# Patient Record
Sex: FEMALE | URBAN - METROPOLITAN AREA
[De-identification: names, ages, dates, MRNs, and addresses within clinical notes are randomized per-mention and may not be internally consistent; named-entity substitution may affect disease eponyms.]

---

## 2020-09-09 ENCOUNTER — APPOINTMENT (OUTPATIENT)
Dept: URBAN - METROPOLITAN AREA CLINIC 319 | Age: 70
Setting detail: DERMATOLOGY
End: 2020-09-09

## 2020-09-09 DIAGNOSIS — L66.1 LICHEN PLANOPILARIS: ICD-10-CM

## 2020-09-09 PROBLEM — L30.9 DERMATITIS, UNSPECIFIED: Status: ACTIVE | Noted: 2020-09-09

## 2020-09-09 PROCEDURE — 11104 PUNCH BX SKIN SINGLE LESION: CPT

## 2020-09-09 PROCEDURE — OTHER BIOPSY BY PUNCH METHOD: OTHER

## 2020-09-09 PROCEDURE — OTHER PRESCRIPTION: OTHER

## 2020-09-09 PROCEDURE — OTHER COUNSELING: OTHER

## 2020-09-09 PROCEDURE — OTHER MIPS QUALITY: OTHER

## 2020-09-09 RX ORDER — CLOBETASOL PROPIONATE 0.5 MG/ML
SOLUTION TOPICAL QHS
Qty: 1 | Refills: 3 | Status: ERX | COMMUNITY
Start: 2020-09-09

## 2020-09-09 ASSESSMENT — LOCATION DETAILED DESCRIPTION DERM: LOCATION DETAILED: MID-OCCIPITAL SCALP

## 2020-09-09 ASSESSMENT — LOCATION ZONE DERM: LOCATION ZONE: SCALP

## 2020-09-09 ASSESSMENT — LOCATION SIMPLE DESCRIPTION DERM: LOCATION SIMPLE: POSTERIOR SCALP

## 2020-09-09 NOTE — PROCEDURE: MIPS QUALITY
Detail Level: Generalized
Quality 111:Pneumonia Vaccination Status For Older Adults: Pneumococcal Vaccination Previously Received
Quality 402: Tobacco Use And Help With Quitting Among Adolescents: Patient screened for tobacco and never smoked
Quality 110: Preventive Care And Screening: Influenza Immunization: Influenza Immunization Administered during Influenza season

## 2020-09-09 NOTE — PROCEDURE: BIOPSY BY PUNCH METHOD
Hemostasis: None
Render Path Notes In Note?: No
Size Of Lesion In Cm (Optional): 0
Wound Care: Vaseline
Billing Type: Third-Party Bill
Anesthesia Type: 1% lidocaine without epinephrine
Biopsy Type: H and E
Dressing: Band-Aid
Epidermal Sutures: 4-0 Nylon
Detail Level: Detailed
Validate Note Data (See Information Below): Yes
Notification Instructions: Patient will be notified of biopsy results. However, patient instructed to call the office if not contacted within 2 weeks.
Home Suture Removal Text: Patient was provided a home suture removal kit and will remove their sutures at home.  If they have any questions or difficulties they will call the office.
Punch Size In Mm: 3.5
Consent: Written consent was obtained and risks were reviewed including but not limited to scarring, infection, bleeding, scabbing, incomplete removal, nerve damage and allergy to anesthesia.
Anesthesia Volume In Cc (Will Not Render If 0): 1
Information: Selecting Yes will display possible errors in your note based on the variables you have selected. This validation is only offered as a suggestion for you. PLEASE NOTE THAT THE VALIDATION TEXT WILL BE REMOVED WHEN YOU FINALIZE YOUR NOTE. IF YOU WANT TO FAX A PRELIMINARY NOTE YOU WILL NEED TO TOGGLE THIS TO 'NO' IF YOU DO NOT WANT IT IN YOUR FAXED NOTE.
Post-Care Instructions: I reviewed with the patient in detail post-care instructions. Patient is to keep the biopsy site dry overnight, and then apply bacitracin twice daily until healed. Patient may apply hydrogen peroxide soaks to remove any crusting.

## 2020-09-09 NOTE — HPI: HAIR LOSS
Previous Labs: No
How Did The Hair Loss Occur?: sudden in onset
How Severe Is Your Hair Loss?: moderate
Additional History: Patient states she had knee surgery about a year and a half ago and she had another surgery this past June.

## 2020-09-23 ENCOUNTER — RX ONLY (RX ONLY)
Age: 70
End: 2020-09-23

## 2020-09-23 ENCOUNTER — APPOINTMENT (OUTPATIENT)
Dept: URBAN - METROPOLITAN AREA CLINIC 319 | Age: 70
Setting detail: DERMATOLOGY
End: 2020-09-23

## 2020-09-23 DIAGNOSIS — Z48.02 ENCOUNTER FOR REMOVAL OF SUTURES: ICD-10-CM

## 2020-09-23 DIAGNOSIS — L66.1 LICHEN PLANOPILARIS: ICD-10-CM

## 2020-09-23 PROCEDURE — 99024 POSTOP FOLLOW-UP VISIT: CPT

## 2020-09-23 PROCEDURE — 99213 OFFICE O/P EST LOW 20 MIN: CPT

## 2020-09-23 PROCEDURE — OTHER SUTURE REMOVAL (GLOBAL PERIOD): OTHER

## 2020-09-23 PROCEDURE — OTHER TREATMENT REGIMEN: OTHER

## 2020-09-23 PROCEDURE — OTHER PRESCRIPTION: OTHER

## 2020-09-23 PROCEDURE — OTHER COUNSELING: OTHER

## 2020-09-23 PROCEDURE — OTHER PATHOLOGY DISCUSSION: OTHER

## 2020-09-23 RX ORDER — ENOXAPARIN SODIUM 30 MG/.3ML
INJECTION SUBCUTANEOUS
Qty: 1 | Refills: 12 | Status: ERX

## 2020-09-23 RX ORDER — CLOBETASOL PROPIONATE 0.5 MG/G
OINTMENT TOPICAL
Qty: 1 | Refills: 2 | Status: ERX | COMMUNITY
Start: 2020-09-23

## 2020-09-23 RX ORDER — ENOXAPARIN SODIUM 30 MG/.3ML
INJECTION SUBCUTANEOUS
Qty: 1 | Refills: 2 | Status: ERX | COMMUNITY
Start: 2020-09-23

## 2020-09-23 ASSESSMENT — LOCATION SIMPLE DESCRIPTION DERM
LOCATION SIMPLE: SCALP
LOCATION SIMPLE: POSTERIOR SCALP

## 2020-09-23 ASSESSMENT — LOCATION DETAILED DESCRIPTION DERM
LOCATION DETAILED: RIGHT SUPERIOR PARIETAL SCALP
LOCATION DETAILED: MID-OCCIPITAL SCALP

## 2020-09-23 ASSESSMENT — LOCATION ZONE DERM: LOCATION ZONE: SCALP

## 2020-09-23 NOTE — PROCEDURE: TREATMENT REGIMEN
Initiate Treatment: Clobetasol ointment thin layer to affected areas at bedtime cover with a head net.\\nLovenox 30mg inject subcutaneously once a week.

## 2020-09-23 NOTE — PROCEDURE: TREATMENT REGIMEN
Continue Regimen: Clobetasol scalp solution apply thin layer in the morning to affected areas on scalp.

## 2020-09-23 NOTE — PROCEDURE: SUTURE REMOVAL (GLOBAL PERIOD)
Detail Level: Detailed
Add 73226 Cpt? (Important Note: In 2017 The Use Of 39476 Is Being Tracked By Cms To Determine Future Global Period Reimbursement For Global Periods): yes

## 2020-09-23 NOTE — PROCEDURE: TREATMENT REGIMEN
Plan: October 10,2020 she is scheduled with primary care doctor to have blood work and will fax our office a copy of lab results to [phone]. We also recommend making sure hyperthyroid is normal with labs it has been normal with her medication. Plan: October 10,2020 she is scheduled with primary care doctor to have blood work and will fax our office a copy of lab results to [phone]. We also recommend making sure hyperthyroid is normal with labs it has been normal with her medication.

## 2020-12-22 ENCOUNTER — APPOINTMENT (OUTPATIENT)
Dept: URBAN - METROPOLITAN AREA CLINIC 319 | Age: 70
Setting detail: DERMATOLOGY
End: 2020-12-22

## 2020-12-22 DIAGNOSIS — L66.1 LICHEN PLANOPILARIS: ICD-10-CM

## 2020-12-22 PROCEDURE — OTHER COUNSELING: OTHER

## 2020-12-22 PROCEDURE — 99213 OFFICE O/P EST LOW 20 MIN: CPT

## 2020-12-22 PROCEDURE — OTHER TREATMENT REGIMEN: OTHER

## 2020-12-22 ASSESSMENT — LOCATION ZONE DERM: LOCATION ZONE: SCALP

## 2020-12-22 ASSESSMENT — LOCATION SIMPLE DESCRIPTION DERM
LOCATION SIMPLE: SCALP
LOCATION SIMPLE: RIGHT SCALP

## 2020-12-22 ASSESSMENT — LOCATION DETAILED DESCRIPTION DERM
LOCATION DETAILED: RIGHT SUPERIOR PARIETAL SCALP
LOCATION DETAILED: RIGHT MEDIAL FRONTAL SCALP

## 2020-12-22 NOTE — PROCEDURE: TREATMENT REGIMEN
Plan: Will consider \\n1. PRP injections recommended as a treatment when patient is stable. \\n2. Hair restoration when stable
Otc Regimen: Rogain foam for men apply in the morning
Discontinue Regimen: Clobetasol scalp solution apply thin layer in the morning to affected areas on scalp.
Continue Regimen: Clobetasol ointment thin layer to affected areas at bedtime cover with a head net \\nLovenox 30mg inject subcutaneously once a week.
Detail Level: Zone

## 2021-01-13 ENCOUNTER — RX ONLY (RX ONLY)
Age: 71
End: 2021-01-13

## 2021-01-13 RX ORDER — ENOXAPARIN SODIUM 30 MG/.3ML
INJECTION SUBCUTANEOUS
Qty: 1 | Refills: 12 | Status: ERX

## 2021-02-05 ENCOUNTER — APPOINTMENT (OUTPATIENT)
Dept: URBAN - METROPOLITAN AREA CLINIC 319 | Age: 71
Setting detail: DERMATOLOGY
End: 2021-02-05

## 2021-02-05 DIAGNOSIS — L65.9 NONSCARRING HAIR LOSS, UNSPECIFIED: ICD-10-CM

## 2021-02-05 PROCEDURE — OTHER ADDITIONAL NOTES: OTHER

## 2021-02-05 PROCEDURE — OTHER PLATELET RICH PLASMA INJECTION: OTHER

## 2021-02-05 PROCEDURE — OTHER TREATMENT REGIMEN: OTHER

## 2021-02-05 NOTE — PROCEDURE: PLATELET RICH PLASMA INJECTION
Post-Care Instructions: **Platelet Rich Plasma (PRP) Post-Treatment Instructions**\\n\\nPlease carefully read and follow these instructions after your PRP treatment.  There are minimal restrictions after your PRP injections allowing you to return to your daily activities almost immediately. \\n\\nDO NOT touch, press, rub or manipulate the treated area(s) for at least 8 hours after your treatment.\\n\\nIf possible, AVOID Advil, Aleve, Aspirin, Ibuprofen, Motrin, Naprosyn, (all non-steroidal anti-inflammatory agents), Vitamin A, Vitamin E, Gingko Biloba, Garlic, Flax Oil, Cod Liver, Essential Fatty Acids (EPA, DHA), for at least 1 week prior to and 2 weeks after your treatment.  Remember, our goal is to create mild inflammation to stimulate hair regrowth, and these listed medications may limit inflammation which could diminish your results.  If you must take Aspirin for cardiac reasons, you certainly may do so, but this may limit your results. \\n\\nIt is normal to experience bruising, redness, itching, swelling and/or soreness that may last from 2-5 days following your procedure.  If you experience any pain or discomfort you may take Tylenol or other Acetaminophen-containing products as directed.\\n\\nWe would prefer if you could refrain from applying ice to the injected area as ice acts as an anti-inflammatory.\\n\\nDo not wet your hair or use any hair products for approximately 8 hours after your treatment.\\n\\nWait until the day after your treatment to shampoo your hair, you may use any shampoo of your choosing. \\n\\nWearing a hat the day of your treatment is fine.  Try to limit direct sun to the treated area for a few days after your treatment. \\n\\nAVOID vigorous exercise and swimming, as well as excessive heat exposure such as saunas/steam rooms for a few days after your treatment.\\n\\nAVOID alcohol, caffeine, and cigarettes for 3 days after your treatment.  Smokers do not heal well and results may take longer. \\n\\nSTART or RESUME Minoxidil 5% (Rogaine) application to your scalp both morning and night.\\n\\nSTART or RESUME using microneedle roller on areas of hair loss ONCE PER WEEK x 12 weeks.  With light pressure, roll device across scalp in 3 directions:  parallel, perpendicular, and diagonal.  Apply Minoxidil 5% (Rogaine) afterwards.\\n\\nPlease do not hesitate to call our office should you have any questions or concerns regarding your PRP treatment or aftercare.\\[phone] Post-Care Instructions: **Platelet Rich Plasma (PRP) Post-Treatment Instructions**\\n\\nPlease carefully read and follow these instructions after your PRP treatment.  There are minimal restrictions after your PRP injections allowing you to return to your daily activities almost immediately. \\n\\nDO NOT touch, press, rub or manipulate the treated area(s) for at least 8 hours after your treatment.\\n\\nIf possible, AVOID Advil, Aleve, Aspirin, Ibuprofen, Motrin, Naprosyn, (all non-steroidal anti-inflammatory agents), Vitamin A, Vitamin E, Gingko Biloba, Garlic, Flax Oil, Cod Liver, Essential Fatty Acids (EPA, DHA), for at least 1 week prior to and 2 weeks after your treatment.  Remember, our goal is to create mild inflammation to stimulate hair regrowth, and these listed medications may limit inflammation which could diminish your results.  If you must take Aspirin for cardiac reasons, you certainly may do so, but this may limit your results. \\n\\nIt is normal to experience bruising, redness, itching, swelling and/or soreness that may last from 2-5 days following your procedure.  If you experience any pain or discomfort you may take Tylenol or other Acetaminophen-containing products as directed.\\n\\nWe would prefer if you could refrain from applying ice to the injected area as ice acts as an anti-inflammatory.\\n\\nDo not wet your hair or use any hair products for approximately 8 hours after your treatment.\\n\\nWait until the day after your treatment to shampoo your hair, you may use any shampoo of your choosing. \\n\\nWearing a hat the day of your treatment is fine.  Try to limit direct sun to the treated area for a few days after your treatment. \\n\\nAVOID vigorous exercise and swimming, as well as excessive heat exposure such as saunas/steam rooms for a few days after your treatment.\\n\\nAVOID alcohol, caffeine, and cigarettes for 3 days after your treatment.  Smokers do not heal well and results may take longer. \\n\\nSTART or RESUME Minoxidil 5% (Rogaine) application to your scalp both morning and night.\\n\\nSTART or RESUME using microneedle roller on areas of hair loss ONCE PER WEEK x 12 weeks.  With light pressure, roll device across scalp in 3 directions:  parallel, perpendicular, and diagonal.  Apply Minoxidil 5% (Rogaine) afterwards.\\n\\nPlease do not hesitate to call our office should you have any questions or concerns regarding your PRP treatment or aftercare.\\[phone]

## 2021-03-12 ENCOUNTER — APPOINTMENT (OUTPATIENT)
Dept: URBAN - METROPOLITAN AREA CLINIC 319 | Age: 71
Setting detail: DERMATOLOGY
End: 2021-03-12

## 2021-03-12 DIAGNOSIS — L65.9 NONSCARRING HAIR LOSS, UNSPECIFIED: ICD-10-CM

## 2021-03-12 PROCEDURE — OTHER ADDITIONAL NOTES: OTHER

## 2021-03-12 PROCEDURE — OTHER PLATELET RICH PLASMA INJECTION: OTHER

## 2021-03-12 PROCEDURE — OTHER TREATMENT REGIMEN: OTHER

## 2021-03-12 NOTE — PROCEDURE: PLATELET RICH PLASMA INJECTION
Post-Care Instructions: **Platelet Rich Plasma (PRP) Post-Treatment Instructions**\\n\\nPlease carefully read and follow these instructions after your PRP treatment.  There are minimal restrictions after your PRP injections allowing you to return to your daily activities almost immediately. \\n\\nDO NOT touch, press, rub or manipulate the treated area(s) for at least 8 hours after your treatment.\\n\\nIf possible, AVOID Advil, Aleve, Aspirin, Ibuprofen, Motrin, Naprosyn, (all non-steroidal anti-inflammatory agents), Vitamin A, Vitamin E, Gingko Biloba, Garlic, Flax Oil, Cod Liver, Essential Fatty Acids (EPA, DHA), for at least 1 week prior to and 2 weeks after your treatment.  Remember, our goal is to create mild inflammation to stimulate hair regrowth, and these listed medications may limit inflammation which could diminish your results.  If you must take Aspirin for cardiac reasons, you certainly may do so, but this may limit your results. \\n\\nIt is normal to experience bruising, redness, itching, swelling and/or soreness that may last from 2-5 days following your procedure.  If you experience any pain or discomfort you may take Tylenol or other Acetaminophen-containing products as directed.\\n\\nWe would prefer if you could refrain from applying ice to the injected area as ice acts as an anti-inflammatory.\\n\\nDo not wet your hair or use any hair products for approximately 8 hours after your treatment.\\n\\nWait until the day after your treatment to shampoo your hair, you may use any shampoo of your choosing. \\n\\nWearing a hat the day of your treatment is fine.  Try to limit direct sun to the treated area for a few days after your treatment. \\n\\nAVOID vigorous exercise and swimming, as well as excessive heat exposure such as saunas/steam rooms for a few days after your treatment.\\n\\nAVOID alcohol, caffeine, and cigarettes for 3 days after your treatment.  Smokers do not heal well and results may take longer. \\n\\nSTART or RESUME Minoxidil 5% (Rogaine) application to your scalp both morning and night.\\n\\nSTART or RESUME using microneedle roller on areas of hair loss ONCE PER WEEK x 12 weeks.  With light pressure, roll device across scalp in 3 directions:  parallel, perpendicular, and diagonal.  Apply Minoxidil 5% (Rogaine) afterwards.\\n\\nPlease do not hesitate to call our office should you have any questions or concerns regarding your PRP treatment or aftercare.\\[phone] Post-Care Instructions: **Platelet Rich Plasma (PRP) Post-Treatment Instructions**\\n\\nPlease carefully read and follow these instructions after your PRP treatment.  There are minimal restrictions after your PRP injections allowing you to return to your daily activities almost immediately. \\n\\nDO NOT touch, press, rub or manipulate the treated area(s) for at least 8 hours after your treatment.\\n\\nIf possible, AVOID Advil, Aleve, Aspirin, Ibuprofen, Motrin, Naprosyn, (all non-steroidal anti-inflammatory agents), Vitamin A, Vitamin E, Gingko Biloba, Garlic, Flax Oil, Cod Liver, Essential Fatty Acids (EPA, DHA), for at least 1 week prior to and 2 weeks after your treatment.  Remember, our goal is to create mild inflammation to stimulate hair regrowth, and these listed medications may limit inflammation which could diminish your results.  If you must take Aspirin for cardiac reasons, you certainly may do so, but this may limit your results. \\n\\nIt is normal to experience bruising, redness, itching, swelling and/or soreness that may last from 2-5 days following your procedure.  If you experience any pain or discomfort you may take Tylenol or other Acetaminophen-containing products as directed.\\n\\nWe would prefer if you could refrain from applying ice to the injected area as ice acts as an anti-inflammatory.\\n\\nDo not wet your hair or use any hair products for approximately 8 hours after your treatment.\\n\\nWait until the day after your treatment to shampoo your hair, you may use any shampoo of your choosing. \\n\\nWearing a hat the day of your treatment is fine.  Try to limit direct sun to the treated area for a few days after your treatment. \\n\\nAVOID vigorous exercise and swimming, as well as excessive heat exposure such as saunas/steam rooms for a few days after your treatment.\\n\\nAVOID alcohol, caffeine, and cigarettes for 3 days after your treatment.  Smokers do not heal well and results may take longer. \\n\\nSTART or RESUME Minoxidil 5% (Rogaine) application to your scalp both morning and night.\\n\\nSTART or RESUME using microneedle roller on areas of hair loss ONCE PER WEEK x 12 weeks.  With light pressure, roll device across scalp in 3 directions:  parallel, perpendicular, and diagonal.  Apply Minoxidil 5% (Rogaine) afterwards.\\n\\nPlease do not hesitate to call our office should you have any questions or concerns regarding your PRP treatment or aftercare.\\[phone]

## 2021-04-09 ENCOUNTER — APPOINTMENT (OUTPATIENT)
Dept: URBAN - METROPOLITAN AREA CLINIC 319 | Age: 71
Setting detail: DERMATOLOGY
End: 2021-04-09

## 2021-04-09 DIAGNOSIS — L65.9 NONSCARRING HAIR LOSS, UNSPECIFIED: ICD-10-CM

## 2021-04-09 PROCEDURE — OTHER PLATELET RICH PLASMA INJECTION: OTHER

## 2021-04-09 PROCEDURE — OTHER ADDITIONAL NOTES: OTHER

## 2021-04-09 PROCEDURE — OTHER TREATMENT REGIMEN: OTHER

## 2021-04-09 NOTE — PROCEDURE: PLATELET RICH PLASMA INJECTION
Post-Care Instructions: **Platelet Rich Plasma (PRP) Post-Treatment Instructions**\\n\\nPlease carefully read and follow these instructions after your PRP treatment.  There are minimal restrictions after your PRP injections allowing you to return to your daily activities almost immediately. \\n\\nDO NOT touch, press, rub or manipulate the treated area(s) for at least 8 hours after your treatment.\\n\\nIf possible, AVOID Advil, Aleve, Aspirin, Ibuprofen, Motrin, Naprosyn, (all non-steroidal anti-inflammatory agents), Vitamin A, Vitamin E, Gingko Biloba, Garlic, Flax Oil, Cod Liver, Essential Fatty Acids (EPA, DHA), for at least 1 week prior to and 2 weeks after your treatment.  Remember, our goal is to create mild inflammation to stimulate hair regrowth, and these listed medications may limit inflammation which could diminish your results.  If you must take Aspirin for cardiac reasons, you certainly may do so, but this may limit your results. \\n\\nIt is normal to experience bruising, redness, itching, swelling and/or soreness that may last from 2-5 days following your procedure.  If you experience any pain or discomfort you may take Tylenol or other Acetaminophen-containing products as directed.\\n\\nWe would prefer if you could refrain from applying ice to the injected area as ice acts as an anti-inflammatory.\\n\\nDo not wet your hair or use any hair products for approximately 8 hours after your treatment.\\n\\nWait until the day after your treatment to shampoo your hair, you may use any shampoo of your choosing. \\n\\nWearing a hat the day of your treatment is fine.  Try to limit direct sun to the treated area for a few days after your treatment. \\n\\nAVOID vigorous exercise and swimming, as well as excessive heat exposure such as saunas/steam rooms for a few days after your treatment.\\n\\nAVOID alcohol, caffeine, and cigarettes for 3 days after your treatment.  Smokers do not heal well and results may take longer. \\n\\nSTART or RESUME Minoxidil 5% (Rogaine) application to your scalp both morning and night.\\n\\nSTART or RESUME using microneedle roller on areas of hair loss ONCE PER WEEK x 12 weeks.  With light pressure, roll device across scalp in 3 directions:  parallel, perpendicular, and diagonal.  Apply Minoxidil 5% (Rogaine) afterwards.\\n\\nPlease do not hesitate to call our office should you have any questions or concerns regarding your PRP treatment or aftercare.\\[phone] Post-Care Instructions: **Platelet Rich Plasma (PRP) Post-Treatment Instructions**\\n\\nPlease carefully read and follow these instructions after your PRP treatment.  There are minimal restrictions after your PRP injections allowing you to return to your daily activities almost immediately. \\n\\nDO NOT touch, press, rub or manipulate the treated area(s) for at least 8 hours after your treatment.\\n\\nIf possible, AVOID Advil, Aleve, Aspirin, Ibuprofen, Motrin, Naprosyn, (all non-steroidal anti-inflammatory agents), Vitamin A, Vitamin E, Gingko Biloba, Garlic, Flax Oil, Cod Liver, Essential Fatty Acids (EPA, DHA), for at least 1 week prior to and 2 weeks after your treatment.  Remember, our goal is to create mild inflammation to stimulate hair regrowth, and these listed medications may limit inflammation which could diminish your results.  If you must take Aspirin for cardiac reasons, you certainly may do so, but this may limit your results. \\n\\nIt is normal to experience bruising, redness, itching, swelling and/or soreness that may last from 2-5 days following your procedure.  If you experience any pain or discomfort you may take Tylenol or other Acetaminophen-containing products as directed.\\n\\nWe would prefer if you could refrain from applying ice to the injected area as ice acts as an anti-inflammatory.\\n\\nDo not wet your hair or use any hair products for approximately 8 hours after your treatment.\\n\\nWait until the day after your treatment to shampoo your hair, you may use any shampoo of your choosing. \\n\\nWearing a hat the day of your treatment is fine.  Try to limit direct sun to the treated area for a few days after your treatment. \\n\\nAVOID vigorous exercise and swimming, as well as excessive heat exposure such as saunas/steam rooms for a few days after your treatment.\\n\\nAVOID alcohol, caffeine, and cigarettes for 3 days after your treatment.  Smokers do not heal well and results may take longer. \\n\\nSTART or RESUME Minoxidil 5% (Rogaine) application to your scalp both morning and night.\\n\\nSTART or RESUME using microneedle roller on areas of hair loss ONCE PER WEEK x 12 weeks.  With light pressure, roll device across scalp in 3 directions:  parallel, perpendicular, and diagonal.  Apply Minoxidil 5% (Rogaine) afterwards.\\n\\nPlease do not hesitate to call our office should you have any questions or concerns regarding your PRP treatment or aftercare.\\[phone]

## 2021-05-06 RX ORDER — CLOBETASOL PROPIONATE 0.5 MG/G
OINTMENT TOPICAL
Qty: 1 | Refills: 2 | Status: ERX

## 2021-05-19 ENCOUNTER — RX ONLY (RX ONLY)
Age: 71
End: 2021-05-19

## 2021-05-19 ENCOUNTER — APPOINTMENT (OUTPATIENT)
Dept: URBAN - METROPOLITAN AREA CLINIC 319 | Age: 71
Setting detail: DERMATOLOGY
End: 2021-05-19

## 2021-05-19 DIAGNOSIS — L66.1 LICHEN PLANOPILARIS: ICD-10-CM

## 2021-05-19 DIAGNOSIS — L65.9 NONSCARRING HAIR LOSS, UNSPECIFIED: ICD-10-CM

## 2021-05-19 PROCEDURE — OTHER COUNSELING: OTHER

## 2021-05-19 PROCEDURE — OTHER TREATMENT REGIMEN: OTHER

## 2021-05-19 PROCEDURE — 99213 OFFICE O/P EST LOW 20 MIN: CPT

## 2021-05-19 RX ORDER — ENOXAPARIN SODIUM 30 MG/.3ML
INJECTION SUBCUTANEOUS
Qty: 4 | Refills: 12 | Status: ERX

## 2021-05-19 ASSESSMENT — LOCATION DETAILED DESCRIPTION DERM
LOCATION DETAILED: LEFT SUPERIOR PARIETAL SCALP
LOCATION DETAILED: MID-FRONTAL SCALP

## 2021-05-19 ASSESSMENT — LOCATION ZONE DERM: LOCATION ZONE: SCALP

## 2021-05-19 ASSESSMENT — LOCATION SIMPLE DESCRIPTION DERM
LOCATION SIMPLE: ANTERIOR SCALP
LOCATION SIMPLE: SCALP

## 2021-05-19 NOTE — PROCEDURE: TREATMENT REGIMEN
Detail Level: Detailed
Plan: Keep July appointment for 4th PRP treatment
Continue Regimen: Minoxidil 5% topical Apply a thin layer to affected areas on scalp twice daily Avoid excessive sweating for 30 minutes after application\\nClobetasol 0.05% ointment apply to affected areas on scalp once a day\\nLovenox inject weekly
Plan: See plan for lichen planopilaris

## 2021-07-09 ENCOUNTER — APPOINTMENT (OUTPATIENT)
Dept: URBAN - METROPOLITAN AREA CLINIC 319 | Age: 71
Setting detail: DERMATOLOGY
End: 2021-07-09

## 2021-07-09 DIAGNOSIS — L65.9 NONSCARRING HAIR LOSS, UNSPECIFIED: ICD-10-CM

## 2021-07-09 PROCEDURE — OTHER TREATMENT REGIMEN: OTHER

## 2021-07-09 PROCEDURE — OTHER PLATELET RICH PLASMA INJECTION: OTHER

## 2021-07-09 PROCEDURE — OTHER PRESCRIPTION: OTHER

## 2021-07-09 PROCEDURE — OTHER ADDITIONAL NOTES: OTHER

## 2021-07-09 RX ORDER — DOXYCYCLINE HYCLATE 100 MG/1
CAPSULE, GELATIN COATED ORAL
Qty: 60 | Refills: 3 | Status: ERX | COMMUNITY
Start: 2021-07-09

## 2021-07-09 NOTE — PROCEDURE: PLATELET RICH PLASMA INJECTION
Post-Care Instructions: **Platelet Rich Plasma (PRP) Post-Treatment Instructions**\\n\\nPlease carefully read and follow these instructions after your PRP treatment.  There are minimal restrictions after your PRP injections allowing you to return to your daily activities almost immediately. \\n\\nDO NOT touch, press, rub or manipulate the treated area(s) for at least 8 hours after your treatment.\\n\\nIf possible, AVOID Advil, Aleve, Aspirin, Ibuprofen, Motrin, Naprosyn, (all non-steroidal anti-inflammatory agents), Vitamin A, Vitamin E, Gingko Biloba, Garlic, Flax Oil, Cod Liver, Essential Fatty Acids (EPA, DHA), for at least 1 week prior to and 2 weeks after your treatment.  Remember, our goal is to create mild inflammation to stimulate hair regrowth, and these listed medications may limit inflammation which could diminish your results.  If you must take Aspirin for cardiac reasons, you certainly may do so, but this may limit your results. \\n\\nIt is normal to experience bruising, redness, itching, swelling and/or soreness that may last from 2-5 days following your procedure.  If you experience any pain or discomfort you may take Tylenol or other Acetaminophen-containing products as directed.\\n\\nWe would prefer if you could refrain from applying ice to the injected area as ice acts as an anti-inflammatory.\\n\\nDo not wet your hair or use any hair products for approximately 8 hours after your treatment.\\n\\nWait until the day after your treatment to shampoo your hair, you may use any shampoo of your choosing. \\n\\nWearing a hat the day of your treatment is fine.  Try to limit direct sun to the treated area for a few days after your treatment. \\n\\nAVOID vigorous exercise and swimming, as well as excessive heat exposure such as saunas/steam rooms for a few days after your treatment.\\n\\nAVOID alcohol, caffeine, and cigarettes for 3 days after your treatment.  Smokers do not heal well and results may take longer. \\n\\nSTART or RESUME Minoxidil 5% (Rogaine) application to your scalp both morning and night.\\n\\nSTART or RESUME using microneedle roller on areas of hair loss ONCE PER WEEK x 12 weeks.  With light pressure, roll device across scalp in 3 directions:  parallel, perpendicular, and diagonal.  Apply Minoxidil 5% (Rogaine) afterwards.\\n\\nPlease do not hesitate to call our office should you have any questions or concerns regarding your PRP treatment or aftercare.\\[phone] Post-Care Instructions: **Platelet Rich Plasma (PRP) Post-Treatment Instructions**\\n\\nPlease carefully read and follow these instructions after your PRP treatment.  There are minimal restrictions after your PRP injections allowing you to return to your daily activities almost immediately. \\n\\nDO NOT touch, press, rub or manipulate the treated area(s) for at least 8 hours after your treatment.\\n\\nIf possible, AVOID Advil, Aleve, Aspirin, Ibuprofen, Motrin, Naprosyn, (all non-steroidal anti-inflammatory agents), Vitamin A, Vitamin E, Gingko Biloba, Garlic, Flax Oil, Cod Liver, Essential Fatty Acids (EPA, DHA), for at least 1 week prior to and 2 weeks after your treatment.  Remember, our goal is to create mild inflammation to stimulate hair regrowth, and these listed medications may limit inflammation which could diminish your results.  If you must take Aspirin for cardiac reasons, you certainly may do so, but this may limit your results. \\n\\nIt is normal to experience bruising, redness, itching, swelling and/or soreness that may last from 2-5 days following your procedure.  If you experience any pain or discomfort you may take Tylenol or other Acetaminophen-containing products as directed.\\n\\nWe would prefer if you could refrain from applying ice to the injected area as ice acts as an anti-inflammatory.\\n\\nDo not wet your hair or use any hair products for approximately 8 hours after your treatment.\\n\\nWait until the day after your treatment to shampoo your hair, you may use any shampoo of your choosing. \\n\\nWearing a hat the day of your treatment is fine.  Try to limit direct sun to the treated area for a few days after your treatment. \\n\\nAVOID vigorous exercise and swimming, as well as excessive heat exposure such as saunas/steam rooms for a few days after your treatment.\\n\\nAVOID alcohol, caffeine, and cigarettes for 3 days after your treatment.  Smokers do not heal well and results may take longer. \\n\\nSTART or RESUME Minoxidil 5% (Rogaine) application to your scalp both morning and night.\\n\\nSTART or RESUME using microneedle roller on areas of hair loss ONCE PER WEEK x 12 weeks.  With light pressure, roll device across scalp in 3 directions:  parallel, perpendicular, and diagonal.  Apply Minoxidil 5% (Rogaine) afterwards.\\n\\nPlease do not hesitate to call our office should you have any questions or concerns regarding your PRP treatment or aftercare.\\[phone]

## 2021-09-07 ENCOUNTER — APPOINTMENT (OUTPATIENT)
Dept: URBAN - METROPOLITAN AREA CLINIC 319 | Age: 71
Setting detail: DERMATOLOGY
End: 2021-09-07

## 2021-09-07 DIAGNOSIS — L66.1 LICHEN PLANOPILARIS: ICD-10-CM

## 2021-09-07 PROCEDURE — OTHER COUNSELING: OTHER

## 2021-09-07 PROCEDURE — 99214 OFFICE O/P EST MOD 30 MIN: CPT

## 2021-09-07 PROCEDURE — OTHER PHOTO-DOCUMENTATION: OTHER

## 2021-09-07 PROCEDURE — OTHER TREATMENT REGIMEN: OTHER

## 2021-09-07 PROCEDURE — OTHER PRESCRIPTION MEDICATION MANAGEMENT: OTHER

## 2021-09-07 ASSESSMENT — LOCATION SIMPLE DESCRIPTION DERM: LOCATION SIMPLE: ANTERIOR SCALP

## 2021-09-07 ASSESSMENT — LOCATION DETAILED DESCRIPTION DERM: LOCATION DETAILED: MID-FRONTAL SCALP

## 2021-09-07 ASSESSMENT — LOCATION ZONE DERM: LOCATION ZONE: SCALP

## 2021-09-07 NOTE — PROCEDURE: TREATMENT REGIMEN
Continue Regimen: Minoxidil 5% topical Apply a thin layer to affected areas on scalp twice daily
Plan: Much better on exam today. Scalp is far less inflamed and patient reports scalp feels better and shedding is greatly lessened.  We will continue on doxycycline for another month with taper above regimen and follow up in 3 months for recheck
Discontinue Regimen: Lovenox injections\\nClobetasol ointment use only as needed if itching
Modify Regimen: Doxycycline 100mg take one capsule by mouth twice daily for 1 more month, then decrease to once daily for one month then stop.
Detail Level: Detailed
Otc Regimen: Nutrafol for women as directed

## 2021-12-15 ENCOUNTER — RX ONLY (RX ONLY)
Age: 71
End: 2021-12-15

## 2021-12-15 RX ORDER — DOXYCYCLINE HYCLATE 100 MG/1
CAPSULE, GELATIN COATED ORAL
Qty: 60 | Refills: 1 | Status: CANCELLED
Stop reason: CLARIF

## 2022-01-11 ENCOUNTER — APPOINTMENT (OUTPATIENT)
Dept: URBAN - METROPOLITAN AREA CLINIC 319 | Age: 72
Setting detail: DERMATOLOGY
End: 2022-01-12

## 2022-01-11 DIAGNOSIS — L66.1 LICHEN PLANOPILARIS: ICD-10-CM

## 2022-01-11 DIAGNOSIS — L29.8 OTHER PRURITUS: ICD-10-CM

## 2022-01-11 PROCEDURE — OTHER MIPS QUALITY: OTHER

## 2022-01-11 PROCEDURE — OTHER COUNSELING: OTHER

## 2022-01-11 PROCEDURE — OTHER PRESCRIPTION MEDICATION MANAGEMENT: OTHER

## 2022-01-11 PROCEDURE — OTHER INTRAMUSCULAR KENALOG: OTHER

## 2022-01-11 PROCEDURE — 96372 THER/PROPH/DIAG INJ SC/IM: CPT

## 2022-01-11 PROCEDURE — OTHER PRESCRIPTION: OTHER

## 2022-01-11 PROCEDURE — 99214 OFFICE O/P EST MOD 30 MIN: CPT | Mod: 25

## 2022-01-11 PROCEDURE — OTHER ADDITIONAL NOTES: OTHER

## 2022-01-11 RX ORDER — TRIAMCINOLONE ACETONIDE 1 MG/G
CREAM TOPICAL BID
Qty: 453.6 | Refills: 3 | Status: ERX | COMMUNITY
Start: 2022-01-11

## 2022-01-11 RX ORDER — SALICYLIC ACID 3 G/100G
LOTION TOPICAL
Qty: 30 | Refills: 0 | Status: ERX | COMMUNITY
Start: 2022-01-11

## 2022-01-11 RX ORDER — HYDROXYCHLOROQUINE SULFATE 200 MG/1
TABLET, FILM COATED ORAL
Qty: 60 | Refills: 3 | Status: ERX | COMMUNITY
Start: 2022-01-11

## 2022-01-11 RX ORDER — LEVOCETIRIZINE DIHYDROCHLORIDE 5 MG
TABLET ORAL
Qty: 30 | Refills: 1 | Status: ERX | COMMUNITY
Start: 2022-01-11

## 2022-01-11 ASSESSMENT — LOCATION DETAILED DESCRIPTION DERM: LOCATION DETAILED: RIGHT BUTTOCK

## 2022-01-11 ASSESSMENT — LOCATION ZONE DERM: LOCATION ZONE: TRUNK

## 2022-01-11 ASSESSMENT — LOCATION SIMPLE DESCRIPTION DERM: LOCATION SIMPLE: RIGHT BUTTOCK

## 2022-01-11 NOTE — PROCEDURE: MIPS QUALITY
Quality 130: Documentation Of Current Medications In The Medical Record: Current Medications Documented
Quality 110: Preventive Care And Screening: Influenza Immunization: Influenza Immunization Administered during Influenza season
Quality 226: Preventive Care And Screening: Tobacco Use: Screening And Cessation Intervention: Tobacco Screening not Performed for Unknown Reasons
Detail Level: Detailed
Quality 431: Preventive Care And Screening: Unhealthy Alcohol Use - Screening: Patient not identified as an unhealthy alcohol user when screened for unhealthy alcohol use using a systematic screening method

## 2022-01-11 NOTE — PROCEDURE: INTRAMUSCULAR KENALOG
Concentration (Mg/Ml): 40.0
Add Option For Additional Mediation: No
Consent: The risks of atrophy were reviewed with the patient.
Administered By (Optional): Dalila
Detail Level: None
Kenalog Preparation: kenalog
Total Volume (Ccs): 1.5
Concentration (Mg/Ml) Of Additional Medication: 2.5

## 2022-02-17 ENCOUNTER — APPOINTMENT (OUTPATIENT)
Dept: URBAN - METROPOLITAN AREA CLINIC 319 | Age: 72
Setting detail: DERMATOLOGY
End: 2022-02-17

## 2022-02-17 DIAGNOSIS — L29.8 OTHER PRURITUS: ICD-10-CM

## 2022-02-17 DIAGNOSIS — L66.1 LICHEN PLANOPILARIS: ICD-10-CM

## 2022-02-17 PROCEDURE — OTHER PRESCRIPTION MEDICATION MANAGEMENT: OTHER

## 2022-02-17 PROCEDURE — 99214 OFFICE O/P EST MOD 30 MIN: CPT

## 2022-02-17 PROCEDURE — OTHER MIPS QUALITY: OTHER

## 2022-02-17 PROCEDURE — OTHER ADDITIONAL NOTES: OTHER

## 2022-02-17 PROCEDURE — OTHER COUNSELING: OTHER

## 2022-02-17 NOTE — PROCEDURE: ADDITIONAL NOTES
Additional Notes: Requested patient to have a retina exam by ophthalmologist for clearance of hydroxychloroquine use.  Given worsening of symptoms today, we will go ahead and write for this and patient will schedule will follow up with eye doctor in next few weeks. Will have note faxed to our office for our records. \\n\\nGiven continued shedding, as well as moderate perifollicular erythema, we will restart IL kenalog injections today and give IM kenalog today.  We will follow up in 4-6 weeks for additional IL kenalog.  We have discussed that if hydroxychloroquine fails to stabilize hair shedding, that we may consider low dose isotretinoin as next treatment option.
Render Risk Assessment In Note?: no
Detail Level: Simple

## 2022-02-17 NOTE — PROCEDURE: PRESCRIPTION MEDICATION MANAGEMENT
Detail Level: Zone
Plan: Markedly better on exam today with absence of perifollicular erythema and hyperkeratosis.  We will forgo IL kenalog today.  We will stay on hydroxychloroquine until next follow up in 3 months.  If still in remission we will consider taper and discontinuing this.  Per patient eye exam normal, still pending note from ophthalmologist.  Patient agrees with treatment plan, and will return sooner if any worsening of symptoms prior to follow up.  Next approach to treatment would include low dose isotretinoin if necessary
Continue Regimen: Hydroxychloroquine as prescribed.\\n\\nContinue antihistamines and can taper off if itching continues to be resolved
Render In Strict Bullet Format?: No

## 2022-02-17 NOTE — PROCEDURE: MIPS QUALITY
Quality 110: Preventive Care And Screening: Influenza Immunization: Influenza Immunization Administered during Influenza season
Detail Level: Detailed
Quality 130: Documentation Of Current Medications In The Medical Record: Current Medications Documented
Quality 431: Preventive Care And Screening: Unhealthy Alcohol Use - Screening: Patient not identified as an unhealthy alcohol user when screened for unhealthy alcohol use using a systematic screening method
Quality 226: Preventive Care And Screening: Tobacco Use: Screening And Cessation Intervention: Tobacco Screening not Performed for Unknown Reasons